# Patient Record
Sex: FEMALE | Race: WHITE | NOT HISPANIC OR LATINO | URBAN - METROPOLITAN AREA
[De-identification: names, ages, dates, MRNs, and addresses within clinical notes are randomized per-mention and may not be internally consistent; named-entity substitution may affect disease eponyms.]

---

## 2017-02-03 ENCOUNTER — OFFICE VISIT - HEALTHEAST (OUTPATIENT)
Dept: GERIATRICS | Facility: CLINIC | Age: 82
End: 2017-02-03

## 2017-02-03 DIAGNOSIS — F32.A DEPRESSION, UNSPECIFIED DEPRESSION TYPE: ICD-10-CM

## 2017-02-03 DIAGNOSIS — J69.0: ICD-10-CM

## 2017-02-03 DIAGNOSIS — I82.4Z2 ACUTE DEEP VEIN THROMBOSIS (DVT) OF DISTAL END OF LEFT LOWER EXTREMITY (H): ICD-10-CM

## 2017-02-03 DIAGNOSIS — F03.90 DEMENTIA (H): ICD-10-CM

## 2017-02-06 ENCOUNTER — OFFICE VISIT - HEALTHEAST (OUTPATIENT)
Dept: GERIATRICS | Facility: CLINIC | Age: 82
End: 2017-02-06

## 2017-02-06 DIAGNOSIS — F03.90 DEMENTIA (H): ICD-10-CM

## 2017-02-06 DIAGNOSIS — J18.9 PNEUMONIA OF BOTH LUNGS DUE TO INFECTIOUS ORGANISM, UNSPECIFIED PART OF LUNG: ICD-10-CM

## 2017-02-06 DIAGNOSIS — I82.4Z2 ACUTE DEEP VEIN THROMBOSIS (DVT) OF DISTAL END OF LEFT LOWER EXTREMITY (H): ICD-10-CM

## 2017-02-07 ENCOUNTER — AMBULATORY - HEALTHEAST (OUTPATIENT)
Dept: GERIATRICS | Facility: CLINIC | Age: 82
End: 2017-02-07

## 2017-02-10 ENCOUNTER — OFFICE VISIT - HEALTHEAST (OUTPATIENT)
Dept: GERIATRICS | Facility: CLINIC | Age: 82
End: 2017-02-10

## 2017-02-10 DIAGNOSIS — F03.90 DEMENTIA (H): ICD-10-CM

## 2017-02-10 DIAGNOSIS — M25.562 LEFT KNEE PAIN: ICD-10-CM

## 2017-02-10 DIAGNOSIS — R53.81 PHYSICAL DECONDITIONING: ICD-10-CM

## 2017-02-10 DIAGNOSIS — F32.A DEPRESSION, UNSPECIFIED DEPRESSION TYPE: ICD-10-CM

## 2017-02-10 DIAGNOSIS — J18.9 PNEUMONIA OF BOTH LUNGS DUE TO INFECTIOUS ORGANISM, UNSPECIFIED PART OF LUNG: ICD-10-CM

## 2017-02-10 DIAGNOSIS — I82.4Z2 ACUTE DEEP VEIN THROMBOSIS (DVT) OF DISTAL END OF LEFT LOWER EXTREMITY (H): ICD-10-CM

## 2017-02-10 DIAGNOSIS — R05.9 COUGH: ICD-10-CM

## 2017-02-10 DIAGNOSIS — I82.409 DVT (DEEP VENOUS THROMBOSIS) (H): ICD-10-CM

## 2017-02-13 ENCOUNTER — OFFICE VISIT - HEALTHEAST (OUTPATIENT)
Dept: GERIATRICS | Facility: CLINIC | Age: 82
End: 2017-02-13

## 2017-02-13 DIAGNOSIS — R53.81 PHYSICAL DECONDITIONING: ICD-10-CM

## 2017-02-13 DIAGNOSIS — F03.90 DEMENTIA (H): ICD-10-CM

## 2017-02-13 DIAGNOSIS — F41.9 ANXIETY: ICD-10-CM

## 2017-02-13 DIAGNOSIS — F32.A DEPRESSION, UNSPECIFIED DEPRESSION TYPE: ICD-10-CM

## 2017-02-13 DIAGNOSIS — I82.4Z2 ACUTE DEEP VEIN THROMBOSIS (DVT) OF DISTAL END OF LEFT LOWER EXTREMITY (H): ICD-10-CM

## 2017-02-13 DIAGNOSIS — K21.9 CHRONIC GERD: ICD-10-CM

## 2017-02-17 ENCOUNTER — OFFICE VISIT - HEALTHEAST (OUTPATIENT)
Dept: GERIATRICS | Facility: CLINIC | Age: 82
End: 2017-02-17

## 2017-02-17 DIAGNOSIS — F32.A DEPRESSION, UNSPECIFIED DEPRESSION TYPE: ICD-10-CM

## 2017-02-17 DIAGNOSIS — J18.9 PNEUMONIA: ICD-10-CM

## 2017-02-17 DIAGNOSIS — F41.9 ANXIETY: ICD-10-CM

## 2017-02-17 DIAGNOSIS — F03.90 DEMENTIA (H): ICD-10-CM

## 2017-02-17 DIAGNOSIS — R53.81 PHYSICAL DECONDITIONING: ICD-10-CM

## 2017-02-17 DIAGNOSIS — F32.A DEPRESSION: ICD-10-CM

## 2017-02-17 DIAGNOSIS — G47.00 INSOMNIA: ICD-10-CM

## 2017-02-17 DIAGNOSIS — I82.409 DVT (DEEP VENOUS THROMBOSIS) (H): ICD-10-CM

## 2017-02-17 DIAGNOSIS — J69.0: ICD-10-CM

## 2017-02-20 ENCOUNTER — OFFICE VISIT - HEALTHEAST (OUTPATIENT)
Dept: GERIATRICS | Facility: CLINIC | Age: 82
End: 2017-02-20

## 2017-02-20 DIAGNOSIS — F03.90 DEMENTIA (H): ICD-10-CM

## 2017-02-20 DIAGNOSIS — J18.9 PNEUMONIA OF BOTH LUNGS DUE TO INFECTIOUS ORGANISM, UNSPECIFIED PART OF LUNG: ICD-10-CM

## 2017-02-20 DIAGNOSIS — F32.A DEPRESSION, UNSPECIFIED DEPRESSION TYPE: ICD-10-CM

## 2017-02-24 ENCOUNTER — OFFICE VISIT - HEALTHEAST (OUTPATIENT)
Dept: GERIATRICS | Facility: CLINIC | Age: 82
End: 2017-02-24

## 2017-02-24 DIAGNOSIS — F32.A DEPRESSION, UNSPECIFIED DEPRESSION TYPE: ICD-10-CM

## 2017-02-24 DIAGNOSIS — I82.4Z2 ACUTE DEEP VEIN THROMBOSIS (DVT) OF DISTAL END OF LEFT LOWER EXTREMITY (H): ICD-10-CM

## 2017-02-24 DIAGNOSIS — J18.9 PNEUMONIA OF BOTH LUNGS DUE TO INFECTIOUS ORGANISM, UNSPECIFIED PART OF LUNG: ICD-10-CM

## 2017-02-24 DIAGNOSIS — F03.90 DEMENTIA (H): ICD-10-CM

## 2017-02-27 ENCOUNTER — OFFICE VISIT - HEALTHEAST (OUTPATIENT)
Dept: GERIATRICS | Facility: CLINIC | Age: 82
End: 2017-02-27

## 2017-02-27 DIAGNOSIS — F41.9 ANXIETY: ICD-10-CM

## 2017-02-27 DIAGNOSIS — J18.9 PNEUMONIA OF BOTH LUNGS DUE TO INFECTIOUS ORGANISM, UNSPECIFIED PART OF LUNG: ICD-10-CM

## 2017-02-27 DIAGNOSIS — F32.A DEPRESSION, UNSPECIFIED DEPRESSION TYPE: ICD-10-CM

## 2017-02-27 DIAGNOSIS — F03.90 DEMENTIA (H): ICD-10-CM

## 2017-02-27 DIAGNOSIS — I82.409 DVT (DEEP VENOUS THROMBOSIS) (H): ICD-10-CM

## 2017-02-27 DIAGNOSIS — F32.A DEPRESSION: ICD-10-CM

## 2017-03-03 ENCOUNTER — OFFICE VISIT - HEALTHEAST (OUTPATIENT)
Dept: GERIATRICS | Facility: CLINIC | Age: 82
End: 2017-03-03

## 2017-03-03 DIAGNOSIS — J69.0: ICD-10-CM

## 2017-03-03 DIAGNOSIS — F32.A DEPRESSION, UNSPECIFIED DEPRESSION TYPE: ICD-10-CM

## 2017-03-03 DIAGNOSIS — I82.4Z2 ACUTE DEEP VEIN THROMBOSIS (DVT) OF DISTAL END OF LEFT LOWER EXTREMITY (H): ICD-10-CM

## 2017-03-03 DIAGNOSIS — F03.90 DEMENTIA (H): ICD-10-CM

## 2017-03-06 ENCOUNTER — OFFICE VISIT - HEALTHEAST (OUTPATIENT)
Dept: GERIATRICS | Facility: CLINIC | Age: 82
End: 2017-03-06

## 2017-03-06 DIAGNOSIS — I82.409 DVT (DEEP VENOUS THROMBOSIS) (H): ICD-10-CM

## 2017-03-06 DIAGNOSIS — F32.A DEPRESSION: ICD-10-CM

## 2017-03-06 DIAGNOSIS — F41.9 ANXIETY: ICD-10-CM

## 2017-03-06 DIAGNOSIS — K58.9 IRRITABLE BOWEL SYNDROME: ICD-10-CM

## 2017-03-06 DIAGNOSIS — F03.90 DEMENTIA (H): ICD-10-CM

## 2017-03-06 DIAGNOSIS — F32.A DEPRESSION, UNSPECIFIED DEPRESSION TYPE: ICD-10-CM

## 2017-03-13 ENCOUNTER — OFFICE VISIT - HEALTHEAST (OUTPATIENT)
Dept: GERIATRICS | Facility: CLINIC | Age: 82
End: 2017-03-13

## 2017-03-13 DIAGNOSIS — R41.3 MEMORY LOSS: ICD-10-CM

## 2017-03-13 DIAGNOSIS — F41.9 ANXIETY: ICD-10-CM

## 2017-03-13 DIAGNOSIS — F03.90 DEMENTIA (H): ICD-10-CM

## 2017-03-13 DIAGNOSIS — I10 HYPERTENSION: ICD-10-CM

## 2017-03-13 DIAGNOSIS — F32.A DEPRESSION, UNSPECIFIED DEPRESSION TYPE: ICD-10-CM

## 2017-03-13 DIAGNOSIS — I82.4Z2 ACUTE DEEP VEIN THROMBOSIS (DVT) OF DISTAL END OF LEFT LOWER EXTREMITY (H): ICD-10-CM

## 2017-03-17 ENCOUNTER — OFFICE VISIT - HEALTHEAST (OUTPATIENT)
Dept: GERIATRICS | Facility: CLINIC | Age: 82
End: 2017-03-17

## 2017-03-17 DIAGNOSIS — F03.90 DEMENTIA (H): ICD-10-CM

## 2017-03-17 DIAGNOSIS — I82.409 DVT (DEEP VENOUS THROMBOSIS) (H): ICD-10-CM

## 2017-03-17 DIAGNOSIS — F32.A DEPRESSION, UNSPECIFIED DEPRESSION TYPE: ICD-10-CM

## 2017-03-17 DIAGNOSIS — F41.9 ANXIETY: ICD-10-CM

## 2017-03-20 ENCOUNTER — OFFICE VISIT - HEALTHEAST (OUTPATIENT)
Dept: GERIATRICS | Facility: CLINIC | Age: 82
End: 2017-03-20

## 2017-03-20 DIAGNOSIS — F41.9 ANXIETY: ICD-10-CM

## 2017-03-20 DIAGNOSIS — K21.9 CHRONIC GERD: ICD-10-CM

## 2017-03-20 DIAGNOSIS — F32.A DEPRESSION, UNSPECIFIED DEPRESSION TYPE: ICD-10-CM

## 2017-03-20 DIAGNOSIS — I10 HYPERTENSION: ICD-10-CM

## 2017-03-20 DIAGNOSIS — F32.A DEPRESSION: ICD-10-CM

## 2017-03-20 DIAGNOSIS — F03.90 DEMENTIA (H): ICD-10-CM

## 2017-03-20 DIAGNOSIS — R53.81 PHYSICAL DECONDITIONING: ICD-10-CM

## 2017-03-20 DIAGNOSIS — I82.4Z2 ACUTE DEEP VEIN THROMBOSIS (DVT) OF DISTAL END OF LEFT LOWER EXTREMITY (H): ICD-10-CM

## 2017-03-21 ENCOUNTER — AMBULATORY - HEALTHEAST (OUTPATIENT)
Dept: GERIATRICS | Facility: CLINIC | Age: 82
End: 2017-03-21

## 2017-06-12 ENCOUNTER — OFFICE VISIT - HEALTHEAST (OUTPATIENT)
Dept: GERIATRICS | Facility: CLINIC | Age: 82
End: 2017-06-12

## 2017-06-12 DIAGNOSIS — K21.9 CHRONIC GERD: ICD-10-CM

## 2017-06-12 DIAGNOSIS — N89.8 VAGINAL IRRITATION: ICD-10-CM

## 2017-06-12 DIAGNOSIS — I10 HYPERTENSION: ICD-10-CM

## 2017-06-12 DIAGNOSIS — F41.9 ANXIETY: ICD-10-CM

## 2017-06-12 DIAGNOSIS — R29.6 RECURRENT FALLS: ICD-10-CM

## 2017-06-12 DIAGNOSIS — S06.9XAA BRAIN INJURY (H): ICD-10-CM

## 2017-06-12 DIAGNOSIS — D69.3 CHRONIC ITP (IDIOPATHIC THROMBOCYTOPENIA) (H): ICD-10-CM

## 2017-06-12 DIAGNOSIS — F32.A DEPRESSION: ICD-10-CM

## 2017-06-14 ENCOUNTER — AMBULATORY - HEALTHEAST (OUTPATIENT)
Dept: ADMINISTRATIVE | Facility: CLINIC | Age: 82
End: 2017-06-14

## 2017-06-14 RX ORDER — TRAZODONE HYDROCHLORIDE 50 MG/1
25 TABLET, FILM COATED ORAL AT BEDTIME
Status: SHIPPED | COMMUNITY
Start: 2017-06-14

## 2017-06-14 RX ORDER — METOPROLOL SUCCINATE 25 MG/1
25 TABLET, EXTENDED RELEASE ORAL DAILY
Status: SHIPPED | COMMUNITY
Start: 2017-06-14

## 2017-06-16 ENCOUNTER — OFFICE VISIT - HEALTHEAST (OUTPATIENT)
Dept: GERIATRICS | Facility: CLINIC | Age: 82
End: 2017-06-16

## 2017-06-16 DIAGNOSIS — R29.6 RECURRENT FALLS: ICD-10-CM

## 2017-06-16 DIAGNOSIS — R30.0 DYSURIA: ICD-10-CM

## 2017-06-16 DIAGNOSIS — F41.9 ANXIETY: ICD-10-CM

## 2017-06-16 DIAGNOSIS — D69.3 CHRONIC ITP (IDIOPATHIC THROMBOCYTOPENIA) (H): ICD-10-CM

## 2017-06-16 DIAGNOSIS — F32.A DEPRESSION: ICD-10-CM

## 2017-06-16 DIAGNOSIS — S06.9XAA BRAIN INJURY (H): ICD-10-CM

## 2017-06-19 ENCOUNTER — OFFICE VISIT - HEALTHEAST (OUTPATIENT)
Dept: GERIATRICS | Facility: CLINIC | Age: 82
End: 2017-06-19

## 2017-06-19 DIAGNOSIS — F32.A DEPRESSION: ICD-10-CM

## 2017-06-19 DIAGNOSIS — F03.90 DEMENTIA (H): ICD-10-CM

## 2017-06-19 DIAGNOSIS — I10 HYPERTENSION: ICD-10-CM

## 2017-06-20 ENCOUNTER — AMBULATORY - HEALTHEAST (OUTPATIENT)
Dept: GERIATRICS | Facility: CLINIC | Age: 82
End: 2017-06-20

## 2017-06-23 ENCOUNTER — OFFICE VISIT - HEALTHEAST (OUTPATIENT)
Dept: GERIATRICS | Facility: CLINIC | Age: 82
End: 2017-06-23

## 2017-06-23 DIAGNOSIS — G47.00 INSOMNIA: ICD-10-CM

## 2017-06-23 DIAGNOSIS — F32.A DEPRESSION: ICD-10-CM

## 2017-06-23 DIAGNOSIS — S06.9XAA BRAIN INJURY (H): ICD-10-CM

## 2017-06-23 DIAGNOSIS — I10 HYPERTENSION: ICD-10-CM

## 2017-06-23 DIAGNOSIS — F03.90 DEMENTIA (H): ICD-10-CM

## 2017-06-26 ENCOUNTER — AMBULATORY - HEALTHEAST (OUTPATIENT)
Dept: GERIATRICS | Facility: CLINIC | Age: 82
End: 2017-06-26

## 2021-06-08 NOTE — PROGRESS NOTES
[facility]    Facility:   [facility] [[id number]]   Code Status: POLST AVAILABLE      85-year-old female is seen for repeat evaluation of multiple medical issues. Patient's initial complaints on admission to hospital were of cough and confusion, diagnosed with pneumonia, the hospital discharge notes are not available for review, discharged on Diflucan and Levaquin, additionally found to have superficial DVT non-obstructive left lower extremity on discharge from hospital. Transferred to TCU for rehabilitation, observation of clinical status and management of medical problems. Since transfer to TCU patient has been anticoagulated, initially with Lovenox, now on Coumadin.    Review of systems: patient notes intermittent cough, no sense of dyspnea. Mood remains diminished. New onset pain left knee, attributes this to activity in physical therapy, no swelling of knee. Worries frequently. Wishes to review recent death of ,. Unaware of memory deficit. Denies fever sweats or chills. Generalized fatigue remains present. Remainder of 12 point review of systems obtained in entirety negative.    Exam: Blood pressure 117/74, 02 93%, pulse 77, respiratory 20, temperature 98.9. Patient lying supine in bed, she just had visitors, oriented times two, somewhat fatigued, in no apparent distress. Mucous membranes moist, intermittent cough during exam, no rales or wheezes, dry crackles diffusely lower lung fields.. Abdomen without hepatosplenomegaly masses or tenderness. Bilateral knees without acute inflammatory change. Trace edema nonpitting left lower extremity. Pedal pulses diminished. Strength and muscle tone diminished and symmetrical upper and lower extremities.     Impression and plan: recent hospitalization with confusion and cough, diagnosed with pneumonia, discharged on Levaquin and Diflucan, patient remains afebrile and with satisfactory oxygenation, cough slowly  resolving, continues to have diffuse abnormal dry crackling lung sounds. DVT nonobstructive left lower extremity, INR returns at 2.0 on 1.5 mg of Coumadin, increase Coumadin to 2 mg Q day with recheck INR 2/13. Chronic deconditioning with need for rehabilitation. Memory deficit, patient remains oriented. Care plan and medications are reviewed.    Electronically signed by: Tati Yu MD

## 2021-06-08 NOTE — PROGRESS NOTES
"[facility]    Facility:   [facility] [[id number]]   Code Status: POLST AVAILABLE       Admission evaluation to TCU. History is taken from accompanying hospital notes which are incomplete and brief, request is made for full notes which are not available at this time, notes will be obtained and further reviewed. 85-year-old female with chronic anxiety, irritable bowel syndrome, osteopenia, hyperlipidemia admitted to hospital with confusion and generalized weakness. Diagnosed with \"pneumonia,\" white count normal throughout stay, procalcitonin normal, acute hypoxic respiratory failure noted, question of aspiration versus community acquired pneumonia, CT scan of chest showed persistent interstitial prominence increased peripheral right upper lobe opacities aortic atherosclerosis, ground glass patchy regions throughout right upper lobe and lungs, additionally diagnosed with  nonocclusive thrombus in the left perineal veins mid to upper , swallow study showed no aspiration or laryngeal penetration, ejection fraction 55%, mild aortic insufficiency. Patient treated with IV antibiotics, stabilized in hospital and discharged to TCU on levofloxacin and fluconazole for 12 weeks. Patient does not recall any specifics of her hospitalization, is unable to contribute information to decision making regarding use of current antibiotics.    Past medical history, family history, social history, medication list, drug allergies reviewed in Epic.    Review of systems: patient is aware of cough without significant sputum production. She is unaware of any past history of chronic lung disease, does not recall recurrent infections in childhood. Has generalized fatigue. Saddened her   approximately 18 months ago, by her report she has not adjusted well to his absence. Denies any active pain.Anxious at all times. No constipation fever sweats or chills. No focal neurologic symptoms. " Remainder of 12 point review of systems obtained negative.    Exam: patient sitting in chair, small in stature, anxious, oriented times two. Blood pressure 124/84, 02 saturation 95%, pulse 83, respiratory 20, temperature 99.0. No facial asymmetry, pharynx without erythema. No carotid bruits. S1 and S2 regular with 2/6 systolic murmur. Lungs with diffuse dry crackles upper and lower lung fields, minimal small airway snapping sounds, no rales or wheezes or rhonchi. Abdomen without splenomegaly or masses. Periphery with negligible nonpitting edema. Pedal pulses symmetrical and diminished. No tremor.    Impression and plan: recent hospitalization with abnormal chest x-ray including groundglass infiltrates and right upper lobe opacity, findings attributed to community acquired pneumonia versus aspiration pneumonia per notes provided, these notes are from hospitalists, full hospital notes are not available for review. With addition of Diflucan to Levaquin would assume consideration was given to chronic bronchiectasis, patient's pulmonary exam is suggestive of bronchiectasis and chronic interstitial disease, pulmonary sounds do not sound to be acute and would be atypical for pneumonia. Oxygenation remains stable. White count normal throughout hospitalization. Continue current antibiotic and antifungal medications. Review of swallow study negative for aspiration, continue to observe eating. Dementia, SLUMS of 11/30 during hospitalization, continue to monitor cognition. Hyperlipidemia on statin. History of uterine prolapse by patient's report, she states she uses Vagifem twice daily for history of prolapse, this will be discontinued in view of recent DVT. DVT superficial nonocclusive, no orders for anticoagulant given on discharge from hospital, Lovenox 50 mg subcu b.i.d. with Coumadin 5 mg Q day, INR to be obtained on 2/6. Total time of admission evaluation greater then 60 minutes, greater than 50% of time spent in  coordination of care and counseling.        Electronically signed by: Tati Yu MD

## 2021-06-08 NOTE — PROGRESS NOTES
[facility]    Facility:   [facility] [[id number]]   Code Status: POLST AVAILABLE     Reevaluation of 85-year-old female admitted to hospital with cough and confusion. Diagnosed with pneumonia, discharged on Diflucan and Levaquin, superficial DVT noted in hospital, transferred to TCU for rehabilitation and management of medical problems which additionally include dementia with SLUMS score of 11/30, prolonged grief reaction and chronic anxiety.    Review of systems: patient states she coughs intermittently, today reports that she has coughed for a long time, does this periodically, bringing up no sputum, denies fever sweats or chills. Denies confusion. Grieves her  who  approximately 1.5 years ago, wishes to discuss him at length on this date. Generalized weakness is present. Concerned that she is not getting her vaginal estrogen. Remainder of 12 point review of systems obtained negative.    Exam: patient semi upright in bed, appears younger than stated age. Blood pressure 126/80, height 60, 02 96%, pulse 80, respiratory 18, temperature 99.1. Oriented times two. Anxious, affect flat and mood depressed. No cervical adenopathy. S1 and S2 regular. Lungs with diffuse dry crackles, trace delay in inspiratory flow, no wheezes or rhonchi, no rales. Abdomen without hepatosplenomegaly masses or tenderness. Periphery with trace nonpitting edema. Pedal pulses symmetrical. Muscle tone and strength diminished.    Impression and plan:  persistent abnormalities on pulmonary clinical exam, clinical exam would suggest pulmonary fibrosis and/or chronic lung disease such as bronchiectasis, oxygenation is stable, trace increase in temperature. Continuing on Levaquin, awaiting remainder of hospital notes for definitive diagnosis. DVT, INR 2.83 on 5 mg Coumadin past three days, continuing on Lovenox, decrease Coumadin to 1 mg per day, recheck INR 24 hours. Dementia with  significant decrease in SLUMS score. Estrogen replacement for unclear reasons, discontinue estradiol vaginally. Deconditioning with need for rehabilitation. Multiple complex medical issues reviewed, revision of anticoagulation.        Electronically signed by: Tati Yu MD

## 2021-06-08 NOTE — PROGRESS NOTES
[facility]    Facility:   [facility] [[id number]]   Code Status: POLST AVAILABLE    Reevaluation of patient admitted to hospital with cough and confusion. Hospital notes have not been retrievable, transferred to TCU for rehabilitation, observation of clinical status and management of medical problems. Patient was not anticoagulated on discharge from hospital, DVT nonocclusive of left lower extremity noted in hospital, patient now anticoagulated.    Review of systems: patient continues to note improvement in strength. Rarely coughs. Denies confusion.Depression present, misses her  . Recent left knee pain resolved in entirety. Generalized fatigue present. She states she is concerned when recalling having a decreased white blood count several years ago, was seen by hematologist for this, did not undergo bone marrow sampling, states the low white blood count resolved, concerned regarding Coumadin potentially impacting her blood work. Remainder of 12 point review of systems obtained in entirety negative.    Exam: Blood pressure 129/77, 02 96%, pulse 76, respiratory 20, temperature 98.3, weight 109. Patient lying supine in bed, oriented times two, no evidence of distress. Mucous membranes moist. Skin turgor intact. No carotid bruits or HJR. S1 and S2 regular. Lungs with minimal small airway dry crackles posterior lung fields, no wheezes or rales. Abdomen without hepatosplenomegaly or masses. Joints without acute inflammatory change. Trace pitting edema left lower extremity. No calf tenderness or swelling.    INR sub therapeutic at 1.24 on 2 milligram of Coumadin, increase Coumadin to 3 mg Q day with recheck INR .     Impression and plan: Chronic anxiety and depression gradually improving, continuing on Wellbutrin. DVT, anticoagulated with subtheraputic INR, adjustment coumadin. Chronic GERD on omeprazole without active symptoms. Deconditioning with  ongoing need for rehabilitation.Recent pneumonia, continued abnormal lung sounds, oxygenation stable.Dementia, cognition improving. Care plan and medications are reviewed, CBC with platelets will be obtained 2/17 with history of neutropenia.     Electronically signed by: Tati Yu MD

## 2021-06-09 NOTE — PROGRESS NOTES
[facility]    Facility:   [facility] [[id number]]   Code Status: POLST AVAILABLE      85-year-old female seen for reevaluation of pulmonary status and general health status, patient admitted to hospital with confusion and cough, rx for pneumonia, diagnosed with lower extremity DVT superficial and nonobstructive, transferred to TCU on antibiotic and antifungal, not on anticoagulant, patient has been anticoagulated since transfer to TCU. Recent home visit went poorly per physical therapy.    Review of systems: patient does not believe she has dementia. She believes her home visit went quite well, she additionally states her daughter felt her home visit was fine. There is a detailed description of home visit per physical therapy which is reviewed. Patient questions if  this physician believes she has dementia. States she is as she has always been. Has functioned well, has her own home and wishes to keep her home. Denies chest pain or palpitations. No lower extremity discomfort. Feeling despondent in general, believes she has been imprisoned and is concerned that she may never leave this facility. She has discussed these issues with family members. Remainder of 12 point review of systems obtained negative.    Exam: patient anxious, oriented times three, cooperative, answers all questions appropriately. Blood pressure 117/79, 0296%, pulse 74, respiratory 16, temperature 99.2. No facial asymmetry. No use of accessory muscles at rest. S1 and S2 regular. Lungs with rare dry crackles diffusely mid and lower lung field, no wheezes. Abdomen without hepatosplenomegaly or masses. Periphery without edema. Pedal pulses intact.    Impression and plan: recent hospitalization with pneumonia, per available notes said to be aspiration pneumonia, patient is additionally on Diflucan, pulmonary status continues stable, persistent abnormal dry small airway pulmonary sounds on exam, no  indication of respiratory compromise. DVT lower extremity, patient currently on Coumadin 5 mg per day with recent INR of 1.81. Chronic anxiety and depression, patient with increasing anxiety related to concerns of cognitive deficit. Reviewed home study with patient, ideally she will undergo cognitive testing through neurology, should dementia be present patient would benefit from intervention with medication such as Aricept. Recent exposure to influenza A continuing on Tamiflu. History of chronic GERD, no indication of aspiration. Care plan and medications are reviewed, discussion with physical therapy, prolonged discussion with patient. Total time of evaluation greater than 35 minutes, greater than 50% of time spent in coordination of care and counseling.    Electronically signed by: Tati Yu MD

## 2021-06-09 NOTE — PROGRESS NOTES
"[facility]    Facility:   [facility] [[id number]]   Code Status: POLST AVAILABLE     85-year-old female is seen for reevaluation. Admitted to hospital with cough, diagnosed with community acquired pneumonia and acute hypoxemia respiratory failure. Left leg DVT noted. Ground glass and interstitial infiltrates were seen throughout the right upper lobe and subpleural ground glass infiltrates in both lobes. Nonocclusive thrombus of left perineal veins, transferred to TCU on Levaquin and fluconazole, the details of choice of these medications unclear. On transfer to TCU patient was not anticoagulated, subsequently has been placed on Coumadin. She continues in rehabilitation.    Review of systems: patient reports minimal cough, believes her strength is gradually improving. Describes an episode 24 hours ago of a sense of anterior chest discomfort. States she was very upset at the time, she was having an argument with her daughter, they have never argued before, she developed a somewhat burning localized pain without radiation and without other associated symptoms, was given \"a tablet under her tongue\" by nursing staff and believes the pain was relieved with this medication. She has never had similar symptoms or received nitroglycerin in the past. Denies focal neurologic symptoms. No pain of the left lower extremity. No fever sweats or chills. Is concerned as her daughter told her she could not go back to her home setting, patient is vague as to the reason for this. Feels anxious much of the time. Remainder of 12 point review of systems obtained negative.    Exam: patient lying supine in bed in no apparent distress. Infrequent dry cough. Oriented times 2.5, blood pressure 116/74, 02 94%, pulse 73, temperature 98.4, weight 109.6. No use of accessory muscles at rest. Pharynx without erythema. S1 and S2 regular. Lungs with rare dry adventitious sounds scattered mid and lower " lung field. Abdomen without hepatosplenomegaly or tenderness. Trace nonpitting edema left lower extremity, strength and muscle tone mildly diminished. No joint inflammatory change.    Impression and plan: DVT, patient on Coumadin, no contraindication to use of Coumadin, subtherapeutic INR currently on 4 mg, increase Coumadin to 5 mg Q day with recheck INR seven days. Mild memory deficit, patient without behavioral abnormalities. Chronic constant anxiety.  Continues on wellbutrin, reviewed potential of other rx modalities of treatment.. Recent episode of vague anterior chest discomfort without radiation and without associated symptoms, questionably relieved with nitroglycerin, issues reviewed in detail with patient, the episode occurred when patient was under extreme stress. Per nursing notes  patient complained of pain, nitroglycerin given 14 55, blood pressure 140/94, pain was improved but still present at 15 00, was given second nitroglycerin, 1505 stated pain was gone with blood pressure 102/76. This pain may have represented esophageal spasm versus myofascial etiology, there is no history of angina, patient was under extreme stress at the time of pain, the questionable relief with nitroglycerin would support a diagnosis of cardiac ischemia, the lack of other symptomatology would not support this diagnosis. No further intervention  at present. Should pain recur  should have a a prompt EKG performed. The description of pain atypical for angina. Multiple medical issues are reviewed with extensive review of the chest pain         Electronically signed by: Tati Yu MD

## 2021-06-09 NOTE — PROGRESS NOTES
[facility]    Facility:   [facility] [[id number]]   Code Status: POLST AVAILABLE       Reevaluation of patient admitted to hospital with confusion, diagnosed with bacterial pneumonia bilateral, DVT nonocclusive of left lower extremity identified, transferred to TCU for rehabilitation, observation of clinical status and management of medical problems which also include chronic anxiety and depression.    Review of systems: patient reports continued minimal cough. Denies fever sweats or chills. Difficulty sleeping, currently on trazodone 12.5 mg QHS, wishes to take a larger dose due to difficulty sleeping. Wishes to review her medications, these include fluconazole which patient is not familiar with. Generalized fatigue is present. Believes her mentation is clearing. No focal neurologic symptoms. Remainder of 12 point review of systems obtained negative.    Exam: Blood pressure 141/90, 02 90%, pulse 90, respiratory 16, temperature 98.2. Patient lying in bed, appears younger than stated age, oriented times three, mildly anxious. Dry cough intermittently during exam. No facial asymmetry, pharynx without erythema. S1 and S2 regular. Scattered harsh small airway dry sounds in mid and lower lung field. No wheezes. Abdomen withou8 hepatosplenomegaly or masses. Periphery with negligible edema nonpitting.     Impression and plan: nonocclusive DVT left lower extremity, no indication of embolic phenomena or pulmonary embolus, INR 1.42 on 3 mg of Coumadin, increase Coumadin to 4 mg Q day with recheck INR on 2/20. History of neutropenia given by patient, white count returns 5.4, hemoglobin 13.7, platelet 220. Bilateral pneumonia, continued abnormal dry lung sounds which would support a diagnosis of chronic lung disease/bronchiectasis or pulmonary fibrosis. Chronic anxiety and depression continuing on Wellbutrin. Significant insomnia, currently on trazodone 12.5 mg Q day, increase  trazodone to 25 mg Q HS for insomnia. Generalized deconditioning with ongoing need for rehabilitation.         Electronically signed by: Tati Yu MD

## 2021-06-09 NOTE — PROGRESS NOTES
[facility]    Facility:   [facility] [[id number]]   Code Status: POLST AVAILABLE      Reevaluation of patient admitted to hospital with cough and confusion, treated for aspiration pneumonia, transferred to TCU for rehabilitation and management of medical problems which include anxiety, depression, anticoagulation for DVT.    Review of systems: patient continues to worry regarding a potential diagnosis of dementia, this issue was raised with patient over the past one week. She denies fever sweats or chills. Aware of rare cough. Denies any issues of swallow. No focal neurologic deficits. Remainder of 12 point review of systems obtained negative.    Exam: patient oriented times three, somewhat withdrawn, lying supine in bed. In no apparent distress. Blood pressure 131/82, 02 97%, pulse 76, respiratory 16, temperature 99.2. No cough during examination. Cooperative and pleasant. Cardiac sounds regular. Pulmonary exam without rales rhonchi or wheezes. Abdomen without masses tenderness organomegaly. Periphery without edema. No calf tenderness or swelling.    Additional hospital notes have arrived, per notes patient was admitted with complaints of dyspnea, initial chest x-ray negative, treated with fluids for dehydration, subsequently developed fever, repeat chest x-ray was consistent with pneumonia and therefore patient was started on antibiotics. Echocardiogram showed normal left ventricular function. Due to concern for aspiration patient underwent swallow study which was negative. Her venous Doppler was positive for left perineal vein DVT. There is no indication regarding use of Diflucan.    Impression and plan: recent hospitalization for cough and altered mental status, treatment for aspiration pneumonia, normal swallow study, pulmonary status remains stable. Chronic anxiety and depression, continues to worry regarding a number of issues. Doppler positive on discharge  from hospital, patient started on anticoagulant post transfer to TCU on which she continues with elevated INR, decrease Coumadin to 4 mg Q day with recheck INR four days.Memory deficit and poor home evaluation, reviewed previously with PT. Multiple issues reviewed with patient.     Electronically signed by: Tati Yu MD

## 2021-06-09 NOTE — PROGRESS NOTES
[facility]    Facility:   [facility] [[id number]]   Code Status: POLST AVAILABLE      85-year-old female is seen for reevaluation and review of multiple medical issues. Patient initially admitted to hospital with generalized weakness, treated for pneumonia, transferred to TCU for rehabilitation and management of medical problems which include recent DVT, dementia with decreased SLUMS score of 13/30, hypertension, chronic anxiety and depression. Patient awaiting placement in care home.    Review of systems: patient reports she is highly distraught, her daughter believes patient is demented, patient continues to decline this diagnosis, daughter made comments which highly upset patient. She has been obsessing about these comments and is distraught. Patient wonders if she should see past  mental healthcare provider in the future. Aware of chronic depression and anxiety, states that she has tried a number of antidepressants in past and they failed to control her symptoms , however Wellbutrin is well-tolerated by patient. Fatigued. Denies active pain. No focal neurologic deficits. No pleuritic chest pain, no palpitations. Remainder 12 point review of systems obtained negative.    Exam: Blood pressure 149/92, 02 95%, pulse 77, respiratory 16, temperature 97.6, weight 111.2, decreased 2 lbs. Patient sitting in chair and coloring, oriented times three, mood is depressed and serious.  No pharyngeal erythema. No facial asymmetry. Thyroid without nodularity. S1 and S2 regular with 2/6 systolic murmur. Pulmonary exam with minimal dry crackles lower aspect, no rales rhonchi or wheezes. Abdomen without masses organomegaly or bruits. Periphery without edema. Strength and muscle tone diminished and symmetrical upper and lower extremities. No hand drift.     Impression and plan: chronic hypertension on metoprolol, systolic blood pressure minimally elevated. Recent hospitalization with  "pneumonia, pulmonary status is satisfactory, minimal dry crackles lung bases, adequate oxygenation. Chronic depression on Wellbutrin, mood remains diminished, reviewed in detail concerns regarding interactions with children, patient believes her daughter is \"an angry person,\" states that a son is also \"angry\" and she has had no contact with this son for five years, other son  in the past, patient continues to have grief related to recent death of . Continue Wellbutrin at current dose. Slums score decreased with diagnosis of dementia, patient declines this diagnosis. Total time of evaluation greater than 35 minutes, greater than 50% of time spent in coordination of care and counseling.     Electronically signed by: Tati Yu MD    "

## 2021-06-09 NOTE — PROGRESS NOTES
[facility]    Facility:   [facility] [[id number]]   Code Status: POLST AVAILABLE    85-year-old female is seen for follow up evaluation and discharge planning. Daughter in attendance on this occasion. Patient living independently presented to emergency room 1/24 with complaints of cough and fatigue. Diagnosed with community acquired pneumonia and acute hypoxic respiratory failure, chest x-ray showing upper lobe opacities and elevated left  diaphragm with basilar atelectasis. CT scan with extensive patchy ground glass and interstitial infiltrates throughout right upper lobe with scattered regions of ground glass infiltrates in both lungs. Speech swallow showed  pour over into follicular and piriformis with nectar and thin consistency, no aspiration. Venous ultrasound showed nonocclusive thrombus left perineal vein mid to upper left calf. Patient was transferred to TCU on levofloxacin with course completed. She was not anticoagulated on transfer to TCU. Patient was started on Lovenox and Coumadin, remained fully anticoagulated during TCU stay. SLUMS obtained times two showing score of 13/30. Decision made that patient would require assisted living placement in view of dementia. Patient with chronic significant anxiety and depression, highly anxious during her TCU stay, was continued on Bupropion  which she has tolerated in past, did not desire trial of additional or alternative medication.. Patient eventually accepted the idea transferring to [facility], family has found an agreeable facility and patient will discharge today. TCU stay otherwise unremarkable, continued mildly abnormal pulmonary sounds lung bases.    Review of systems: patient notes a bruise of the dorsum right hand. Denies cough or sputum production. Mood remains low, not anxious at present. Denies chest pain or palpitations. No fever sweats or chills. Remainder of 12 point review of systems obtained  negative.    Exam: patient oriented times two, alert, highly cooperative, mildly anxious. Vital signs reviewed. Weight 113.9. No facial asymmetry. No use of accessory muscles at rest. S1 and S2 regular with systolic murmur. Pulmonary exam with scattered dry crackles lung bases, no wheezes or rales. Abdomen without masses tenderness organomegaly or bruits. Strength symmetrical and intact upper and lower extremities. Trace pitting edema right lower extremity, no calf tenderness or swelling. Left lower extremity with negligible edema. Pedal pulses intact.    Impression and plan: recent hospitalization for pneumonia, ground glass appearance would suggest potential chronic underlying pulmonary disease, oxygenation is stable, nebulizer has not been required of late. Chronic significant anxiety on Wellbutrin. Chronic insomnia on trazodone 12.5 mg QHS. History of  postmenopausal vaginitis on Vagifem twice weekly. Allergic rhinitis on fluticasone nasal spray one spray Q day PRN. Chronic GERD on omeprazole. History of hypertension, on a formulary preparation of metoprolol at 5 mg Q day, this medication was continued in TCU, the indication for use of this unusually small dose is unknown. Patient will be discharged to [facility], she will be followed by a nurse practitioner in that setting, patient should remain on Coumadin for three month period of time. Coumadin dose currently 5 mg Q day, recent increase from 4 mg with last INR of 1.69 with repeat INR pending. Discussion regarding use of Aricept for dementia, patient has been reluctant to start any new medication, she will consider use of Aricept in future. Medications discontinued from those at time of admission, fluconazole discontinued, acetaminophen will be used PRN. Total time of discharge evaluation greater than 35 minutes, greater than 50% of time spent in coordination of care and counseling.      Electronically signed by: Tati Yu MD

## 2021-06-09 NOTE — PROGRESS NOTES
[facility]    Facility:   [facility] [[id number]]   Code Status: POLST AVAILABLE    Reevaluation of 85-year-old female admitted to hospital with weakness and fever. Diagnosed with aspiration pneumonia, treated with antibiotic, transferred to TCU for rehabilitation, anticoagulation initiated post admission to TCU with findings of DV T on exit from hospital. Recent home evaluation per physical therapy went poorly. Patient has completed second slums test with score of 12 through 14/30.    Review of systems: patient feels despondent. Does not agree that she has dementia. Believes that she performs well over all. Has accepted that she will be transferring to an [facility] although wishes to return to her home setting. Disappointed that her children believe she has dementia. Believes that she performed well on the slums test, denies any difficulty with confusion or memory deficit. Feels sad at all times. Reviews issues with her children, lack of communication with one child, her  she states was harsh on that child, believes her grandchildren have issues. Denies any head injury. Fatigue is present at all times. No cardiac or pulmonary symptoms. Is reluctant to begin any new medication including for memory deficit. Remainder of 12 point review of systems obtained negative.    Exam: vital signs reviewed over past four days. Patient lying supine in bed, mood depressed, oriented times three. No facial asymmetry. No tremor or muscular fasciculations. Strength and muscle tone diminished and symmetrical. S1 and S2 regular. Pulmonary exam without adventitious sounds. Abdomen without masses or tenderness. Periphery with trace nonpitting edema left, pedal pulses intact.    INR returns at 2.18 on 4 mg Coumadin, to continue 4 mg Coumadin with recheck INR seven days.    Impression and plan: dementia, slums score 12 - 14/30, patient is oriented on exam. Patient declines initiation of  Aricept, reviewed potential use of such medication in future. Superficial non-occlusive DVT left lower extremity, satisfactory exam, fully anticoagulated. Recent aspiration pneumonia, pulmonary exam is satisfactory. Chronic anxiety and depression continuing on SSRI. Deconditioning with need for rehabilitation. Reactive depression superimposed on chronic depression. Patient is agreeable to residing in [facility]. Total time of evaluation greater than 45 minutes, greater than 50% of time spent in coordination of care and counseling.      Electronically signed by: Tati Yu MD

## 2021-06-09 NOTE — PROGRESS NOTES
[facility]    Facility:   [facility] [[id number]]   Code Status: POLST AVAILABLE    Reevaluation of 85-year-old female initially admitted to hospital with fever, confusion and cough, diagnosed with bilateral aspiration pneumonia, treated with antibiotic, DVT noted during hospitalization, transferred to TCU for rehabilitation, continues anticoagulated. Decision has been made for patient to move to [facility] in view of cognitive impairment.    Review of systems: patient continues to have reactive depression related to loss of her home, wishes she could return to her home setting. Has chosen an [facility], does not recall the name of it. Denies fever sweats or chills. Aware of minimal G.I. distress, multiple residents with similar symptoms. Denies nausea or vomiting. No fever sweats or chills. Mood low at all times.Frequently anxious, no panic attacks. Continues on antidepressant. Remainder of 12 point review of systems obtained negative.    Exam: patient alert, sitting in chair, mood depressed. Cooperative and will groomed. Blood pressure 154/93, 02 97%, pulse 80, respiratory 17, temperature 99.1. Mucous membranes moist. No facial asymmetry. No carotid bruits. S1 and S2 regular. Pulmonary exam with dry crackles bases, no wheezes or rales. Abdomen with normoactive bowl sounds, no focal tenderness or organomegaly. Periphery with negligible nonpitting edema right, no calf tenderness or swelling.    INR returns at 2.20 on 5 mg Coumadin, last INR  1.69 on 4 mg, continue 5 mg Coumadin and recheck INR in seven days.    Impression and plan: anticoagulation for DVT right lower extremity, therapeutic INR, no excessive bruising or bleeding. Dementia, no behavioral abnormalities. Recent pulmonary process and diagnosis of pneumonia, dry crackles in lung base, no indication of hypoxia or respiratory compromise, continue observation. Chronic depression and anxiety remaining on Bupropion,  reactive depression related to loss of home. Care plan and medications are reviewed, review of INR. Continue rehabilitation measures.      Electronically signed by: Tati Yu MD

## 2021-06-09 NOTE — PROGRESS NOTES
[facility]    Facility:   [facility] [[id number]]   Code Status: POLST AVAILABLE      Reevaluation of 85-year-old female. Patient was hospitalized with complaints of cough, diagnosed with community acquired pneumonia, respiratory failure with hypoxemia on admission. Additionally superficial DVT left leg diagnosed during hospitalization. Transferred to TCU for rehabilitation, observation of clinical status and management of medical problems. Patient anticoagulated since transfer to TCU. SLUMS score consistently 12 to 14/30. Patient with dementia anticipates need for [facility] placement. Care conference planned in a.m.    Review of systems: patient continues to feel despondent that she will have to leave her home and move to an [facility]. She does not believe she has dementia. She is disappointed that her family members agree that she has dementia. She believes she can adequately function in home setting. She has been on Diflucan for onychomycosis of the toenails. She notes no improvement in the onychomycotic changes which are not painful. She denies cough or sputum production. No fever sweats or chills.Chronically experiences GI distress when stressed, at times with loose stools. Remainder of 12 point review of systems obtained negative.     Exam: patient sitting in chair, cautiously coloring with pencils. Oriented times three, mood depressed. Blood pressure 128/72, pulse 72, temperature 98.9, weight 111.4. No pharyngeal erythema. No facial asymmetry. S1 and S2 regular with 2/6 systolic murmur. Pulmonary exam without rales rhonchi or wheezes. Abdomen without masses tenderness organomegaly. Periphery without edema. Strength symmetrical upper and lower extremities.     Impression and plan: recent hospitalization with cough and pneumonia, pulmonary exam and oxygenation satisfactory, no residual. Chronic depression continuing on SSRI, reactive depression related to need for  placement in [facility]. Onychomycosis of toenails, fluconazole will be discontinued. Chronic anxiety continuing on bupropion for both anxiety and depression. DVT continuing on Coumadin, INR is pending at this time, Coumadin dose currently 4 mg Q day. No evidence of post phlebitic syndrome or similar, or complication post DVT. IBS, chronic, no acute abdomen. Anticoagulant should be continued for three months. Care plan and medications are reviewed.    Electronically signed by: Tati Yu MD

## 2021-06-11 NOTE — PROGRESS NOTES
[facility]    Facility:   [facility] [[id number]]   Code Status: POLST AVAILABLE      Admission evaluation to TCU. History is taken from accompanying medical notes which are scant and from patient who is a somewhat reliable historian. 85-year-old female with long-standing history of ITP, dementia, frequent falls, chronic anxiety and depression, recent prolonged stay in TCU following upper respiratory infection, long-standing hypertension, was hospitalized 6/7 following a fall, platelet count 1000 on admission, CT scan of head showed multiple small areas of bleeding, follow up CT scan of head showed improved status of bleeds, required neuro- ICU admission, followed by hematology and neurosurgery in hospital, received platelet transfusions prednisone and IVIG. Wellbutrin and trazodone placed on hold in hospital, beta blocker continued, stabilized with improvement of platelets and transferred to TCU for observation of neurologic status and management of medical problems.    Past medical history, social history, family history, drug allergies, code status reviewed in Epic.    Medications prednisone 40 mg Q day times five days, 30 mg Q day times five days, 20 mg Q day until follow up with hematologist, folic acid 1 mg Q day, metoprolol 25 mg Q day, multivitamin Q day, omeprazole 20 mg Q day, aspirin 81 mg discontinued, multivitamin discontinued. Patient was transferred off Wellbutrin 300 mg Q day or trazodone 25 mg Q HS, she questions if she should receive these medications.    Review of systems: patient unaware of headache, denies change in cognitive status. Reports that she saw a hematologist one month prior to hospitalization and that her platelets were normal at that time. She was aware of mild bruising of the arms prior to hospitalization. Concerned that she believes a catheter was put in her vagina, she describes irritation of outer vaginal area, in past was on  estrogen which was discontinued some months ago. Highly depressed regarding her need for hospitalization and need to be in TCU. Pleased with her new intermediate setting. Generalized fatigue at present. Recalls her fall, states she simply fell backwards and struck her head, this was not preceded by any known events. Remainder of 12 point review of systems obtained negative.    Exam: patient sitting in chair, oriented times three, mood depressed. Blood pressure 100/66, 02 95%, pulse 86, temperature 99.3, weight 109.8. Scattered bruises of the forearms in various stages of healing, no hematomas. No focal scalp tenderness. Pupils equal round and reactive to light. No facial asymmetry. Pharynx without erythema. Mucous membranes moist. No carotid bruits. Thyroid finely granular, without dominant nodules or tenderness. S1 and S2 regular with 2/6 systolic murmur. Pulmonary exam without rales rhonchi or wheezes. Abdomen without masses tenderness organomegaly. Periphery with negligible edema about ankles and superior aspect of feet. Strength and muscle tone -1 and symmetrical. No hand drift or tremor.    Impression and plan: chronic ITP, recent platelet count of 1000, reported to have improved platelet count in hospital post transfusion of platelets, prednisone and IVIG. Platelet count ordered in four days. Status post total brain injury following a fall with multiple small areas of bleed in head, no focal neurologic deficits, neurologically stable. Followed by neurology in hospital. Chronic underlying anxiety and depression, Wellbutrin and trazodone discontinued in hospital, will be resumed when okay from hematology has been received, however patient has been on these medications for a prolonged time and they are not known to be marrow suppressive/platelet inhibitive. Monitor at present. History of recurrent falls multifactorial. Complaints of vaginal/perineal irritation, no complaints of dysuria to suggest UTI, monitor.  Postmenopausal estrogen dryness likely cause of symptoms, emollient can be considered topically. Hypertension on metoprolol with adequate control of blood pressure. Chronic GERD on omeprazole. Steroid taper underway, use of prednisone likely contributing to patient's anxiety. Recent prolonged TCU stay following significant pulmonary infection with pneumonia and prolonged recovery, pulmonary status satisfactory at present. Question of syncope at time of recent fall, no clear indication of syncopal event. Multiple complex medical issues are reviewed in detail with patient, care plan, medications, outside medical records reviewed.    Electronically signed by: Tati Yu MD

## 2021-06-11 NOTE — PROGRESS NOTES
[facility]    Facility:   [facility] [[id number]]   Code Status: POLST AVAILABLE       85-year-old female is seen for reevaluation and discharge planning. Admitted to hospital 6/7 through 6/10 with acute ITP and total brain injury. Patient struck her head on her bed falling backward, had SAH with sylvian fissure involvement, platelet count 1000. Known history of ITP with recent satisfactory platelet count. Treated in hospital with TXA, IVIG and platelets, resolution of intracranial bleed, transferred to TCU for rehabilitation, observation of clinical status and management of medical problems which include chronic hypertension, chronic anxiety depression and insomnia. Patient is completing her course in TCU. Chronically on Wellbutrin and trazodone which were restarted during TCU stay. Repeat platelet count 59K. No evidence of bleeding. Patient did have positive urine culture with greater than 100,000 E. coli and Providencia rettgeri, patient declined administration of IV Rocephin, stating her symptoms of urethral irritation were resolved. Significant decrease in mood during TCU stay.    Review of systems: patient is seen with her daughter on this occasion. Mood remains diminished, feels anxious much of the time, notes improvement in mood with resumption of Wellbutrin and trazodone. Denies urinary symptoms. Frustrated regarding the appearance of her forearms with scattered bruises resolving. Denies focal neurologic deficits. No cardiac or pulmonary symptoms. Generalized weakness is present. Remainder of 12 point review of systems obtained negative.     Exam: patient alert and oriented times two, mood depressed, otherwise in no apparent distress. Blood pressure 133/82, 02 95%, pulse 74, respiratory 16, temperature 97.5, weight 155.2. Mucous membranes moist. Cranial nerves intact. No carotid bruits. S1 and S2 regular with 2/6 systolic murmur. Pulmonary exam without  rales rhonchi or wheezes. Abdomen without masses tenderness organomegaly. No hand drift. Strength symmetrical and diminished upper and lower extremities. No calf tenderness or swelling. Skin turgor intact.    Impression and plan: advanced dementia without behavioral abnormalities. Resumption of Wellbutrin and trazodone while in TCU, patient chronically on these medications. Mood depressed and with anxiety, psyche overall stable. Positive urine culture, patient opted for no treatment as she is asymptomatic. Past history of DVT, patient not anticoagulated. ITP with platelet count of 59K. Chronic anemia normochromic normocytic. Total traumatic brain injury, cognition stable and at baseline. Concerns regarding short and long-term care issues reviewed with patient and patient's daughter in attendance, patient will follow up with regular physician over the next seven days, change in medications from those at admission resumption of Wellbutrin and trazodone. Total time of discharge evaluation greater then 35 minutes, greater than 50% of time spent in coordination of care and counseling.        Electronically signed by: Tati Yu MD

## 2021-06-11 NOTE — PROGRESS NOTES
[facility]    Facility:   [facility] [[id number]]   Code Status: POLST AVAILABLE      Reevaluation of patient with chronic ITP, 85 years of age, frequent falls and generalized weakness, dementia, patient fell hitting her head on her mattress, admitted to hospital with platelet count of 1000, CT scan of head showing multiple areas of bleeding, neurologically stable, received IVIG, prednisone, platelet infusion, stabilized in transferred to TCU for rehabilitation, observation of clinical status, management of medical problems. Patient is seen with her daughter on this occasion.    Review of systems: patient and daughter with multiple questions and concerns. Patient does not understand why her platelets were low, she is followed on a regular basis. Denies focal neurologic deficits. Aware of mild memory deficit. Concerned that family members will also have low platelets. Anxious to return to home setting. Very depressed. Does not understand why she needs to be in TCU. States she does fall frequently, has not hit her head previously. Concerned regarding bruises which are both her forearms. Complaints of dysuria and vaginal dryness, A&D ointment has been applied to the vaginal area. Daughter believes her mother has increasing confusion and is concerned regarding mother's depression and anxiety. Additionally believes her mother is paranoid. Patient has restarted her Wellbutrin and trazodone which she has chronically taken. Remainder of 12 point review of systems obtained negative.    Exam: patients mood depressed, affect flat, oriented times two, repetitive in questions. Blood pressure 132/87, pulse 90, temperature 97.3, weight 114. No facial asymmetry. Pharynx without erythema. No carotid bruits. S1 and S2 regular. 2/6 systolic murmur. Pulmonary exam without rales or wheezes. Abdomen without masses or tenderness. Periphery without edema. Strength and muscle tone  diminished and symmetrical upper and lower extremities. Pedal pulses intact. Joints without acute inflammatory change. Cranial nerves intact.    Platelets currently 59K.    Impression and plan: status post total brain injury in patient with ITP, platelet count improved, chronic anxiety and depression, recent resumption of Wellbutrin and trazodone, patient neurologically remains stable, daughter believes mother has more paranoia and confusion, this is not observed by clinician today. Hypertension with adequate control of blood pressure. Anxiety in part related to prednisone use, this reviewed with patient and patient's daughter, they do not wish further medication to be initiated for anxiety. Dysuria, urine culture obtained. Total time of evaluation greater than 50 minutes, greater than 50% of time spent in coordination of care and counseling. Continue rehabilitation measures and observation of neurologic status.    Electronically signed by: Tati Yu MD

## 2021-06-11 NOTE — PROGRESS NOTES
[facility]    Facility:   [facility] [[id number]]   Code Status: POLST AVAILABLE     Reevaluation of patient with ITP, admitted to hospital with intracranial hemorrhage which occurred striking her head on a mattress, transferred to hospital, platelet count 1000, treated with IVIG, prednisone and platelet transfusion. Stabilized in transferred to TCU for rehabilitation, observation of neurologic status and management of medical problems which include hypertension, chronic GERD, chronic depression. Patient has resumed trazodone and Wellbutrin which she was on previously, recent platelet count 59,000.    Review of systems: patient believes she is improving. Is concerned regarding the appearance of bruises on her forearms. Unaware of any new bruises. Denies focal neurologic deficits. No fever sweats or chills. Gait is mildly unstable and has chronically been so. Sleeping without impairment. No seizure activity. Remainder of 12 point review of systems obtained negative.    Exam: vital signs reviewed, affect flat and mood depressed. Cooperative, oriented times two. No facial asymmetry. Mucous membranes moist. No carotid bruits or HJR. S1 and S2 regular with systolic murmur. Pulmonary exam without rales or rhonchi. Abdomen without masses organomegaly. Bilateral forearms with scattered bruises in various stages of resolution. No hematoma formation. Periphery without edema. No calf tenderness or swelling.    Impression and plan: status post total brain injury and intracranial hemorrhage secondary to mild head trauma with thrombocytopenia. Recent platelet count satisfactory, continues on prednisone 20 mg Q day. No evidence of focal neurologic deficit. Advanced dementia without behavioral abnormalities. Hypertension with adequate control of blood pressure. Chronic depression and anxiety, mood improved. History of generalized weakness deconditioning and multiple falls,  continuing in rehabilitation. Care plan and medications are reviewed.         Electronically signed by: Tati Yu MD

## 2021-06-15 PROBLEM — F41.9 ANXIETY: Status: ACTIVE | Noted: 2017-03-19

## 2021-06-15 PROBLEM — F03.90 DEMENTIA (H): Status: ACTIVE | Noted: 2017-02-06

## 2021-06-15 PROBLEM — R41.82 ALTERED MENTAL STATUS: Status: ACTIVE | Noted: 2017-01-24
